# Patient Record
Sex: FEMALE | Race: WHITE | NOT HISPANIC OR LATINO | Employment: FULL TIME | URBAN - METROPOLITAN AREA
[De-identification: names, ages, dates, MRNs, and addresses within clinical notes are randomized per-mention and may not be internally consistent; named-entity substitution may affect disease eponyms.]

---

## 2022-04-25 ENCOUNTER — HOSPITAL ENCOUNTER (EMERGENCY)
Facility: HOSPITAL | Age: 27
Discharge: PSYCHIATRIC OR SUBSTANCE USE FACILITY | End: 2022-04-26
Attending: EMERGENCY MEDICINE | Admitting: EMERGENCY MEDICINE

## 2022-04-25 DIAGNOSIS — R45.851 DEPRESSION WITH SUICIDAL IDEATION: Primary | ICD-10-CM

## 2022-04-25 DIAGNOSIS — F32.A DEPRESSION WITH SUICIDAL IDEATION: Primary | ICD-10-CM

## 2022-04-25 DIAGNOSIS — F31.9 BIPOLAR 1 DISORDER: ICD-10-CM

## 2022-04-25 LAB
ALBUMIN SERPL-MCNC: 4.3 G/DL (ref 3.5–5.2)
ALBUMIN/GLOB SERPL: 1.5 G/DL
ALP SERPL-CCNC: 127 U/L (ref 39–117)
ALT SERPL W P-5'-P-CCNC: 28 U/L (ref 1–33)
AMPHET+METHAMPHET UR QL: NEGATIVE
AMPHETAMINES UR QL: NEGATIVE
ANION GAP SERPL CALCULATED.3IONS-SCNC: 10 MMOL/L (ref 5–15)
APAP SERPL-MCNC: <5 MCG/ML (ref 0–30)
AST SERPL-CCNC: 21 U/L (ref 1–32)
B-HCG UR QL: NEGATIVE
BACTERIA UR QL AUTO: ABNORMAL /HPF
BARBITURATES UR QL SCN: NEGATIVE
BASOPHILS # BLD AUTO: 0.12 10*3/MM3 (ref 0–0.2)
BASOPHILS NFR BLD AUTO: 1 % (ref 0–1.5)
BENZODIAZ UR QL SCN: NEGATIVE
BILIRUB SERPL-MCNC: 0.2 MG/DL (ref 0–1.2)
BILIRUB UR QL STRIP: NEGATIVE
BUN SERPL-MCNC: 10 MG/DL (ref 6–20)
BUN/CREAT SERPL: 14.3 (ref 7–25)
BUPRENORPHINE SERPL-MCNC: NEGATIVE NG/ML
CALCIUM SPEC-SCNC: 8.9 MG/DL (ref 8.6–10.5)
CANNABINOIDS SERPL QL: NEGATIVE
CHLORIDE SERPL-SCNC: 108 MMOL/L (ref 98–107)
CLARITY UR: ABNORMAL
CO2 SERPL-SCNC: 23 MMOL/L (ref 22–29)
COCAINE UR QL: NEGATIVE
COLOR UR: YELLOW
CREAT SERPL-MCNC: 0.7 MG/DL (ref 0.57–1)
DEPRECATED RDW RBC AUTO: 37.2 FL (ref 37–54)
EGFRCR SERPLBLD CKD-EPI 2021: 123.3 ML/MIN/1.73
EOSINOPHIL # BLD AUTO: 0.39 10*3/MM3 (ref 0–0.4)
EOSINOPHIL NFR BLD AUTO: 3.3 % (ref 0.3–6.2)
ERYTHROCYTE [DISTWIDTH] IN BLOOD BY AUTOMATED COUNT: 12.4 % (ref 12.3–15.4)
ETHANOL BLD-MCNC: <10 MG/DL (ref 0–10)
GLOBULIN UR ELPH-MCNC: 2.8 GM/DL
GLUCOSE SERPL-MCNC: 79 MG/DL (ref 65–99)
GLUCOSE UR STRIP-MCNC: NEGATIVE MG/DL
HCT VFR BLD AUTO: 40 % (ref 34–46.6)
HGB BLD-MCNC: 13.8 G/DL (ref 12–15.9)
HGB UR QL STRIP.AUTO: NEGATIVE
HOLD SPECIMEN: NORMAL
HOLD SPECIMEN: NORMAL
HYALINE CASTS UR QL AUTO: ABNORMAL /LPF
IMM GRANULOCYTES # BLD AUTO: 0.29 10*3/MM3 (ref 0–0.05)
IMM GRANULOCYTES NFR BLD AUTO: 2.5 % (ref 0–0.5)
KETONES UR QL STRIP: NEGATIVE
LEUKOCYTE ESTERASE UR QL STRIP.AUTO: ABNORMAL
LYMPHOCYTES # BLD AUTO: 3.07 10*3/MM3 (ref 0.7–3.1)
LYMPHOCYTES NFR BLD AUTO: 26 % (ref 19.6–45.3)
MCH RBC QN AUTO: 29.1 PG (ref 26.6–33)
MCHC RBC AUTO-ENTMCNC: 34.5 G/DL (ref 31.5–35.7)
MCV RBC AUTO: 84.2 FL (ref 79–97)
METHADONE UR QL SCN: NEGATIVE
MONOCYTES # BLD AUTO: 0.91 10*3/MM3 (ref 0.1–0.9)
MONOCYTES NFR BLD AUTO: 7.7 % (ref 5–12)
NEUTROPHILS NFR BLD AUTO: 59.5 % (ref 42.7–76)
NEUTROPHILS NFR BLD AUTO: 7.03 10*3/MM3 (ref 1.7–7)
NITRITE UR QL STRIP: NEGATIVE
NRBC BLD AUTO-RTO: 0 /100 WBC (ref 0–0.2)
OPIATES UR QL: NEGATIVE
OXYCODONE UR QL SCN: NEGATIVE
PCP UR QL SCN: NEGATIVE
PH UR STRIP.AUTO: 6.5 [PH] (ref 5–8)
PLATELET # BLD AUTO: 295 10*3/MM3 (ref 140–450)
PMV BLD AUTO: 8.9 FL (ref 6–12)
POTASSIUM SERPL-SCNC: 4 MMOL/L (ref 3.5–5.2)
PROPOXYPH UR QL: NEGATIVE
PROT SERPL-MCNC: 7.1 G/DL (ref 6–8.5)
PROT UR QL STRIP: NEGATIVE
RBC # BLD AUTO: 4.75 10*6/MM3 (ref 3.77–5.28)
RBC # UR STRIP: ABNORMAL /HPF
REF LAB TEST METHOD: ABNORMAL
SALICYLATES SERPL-MCNC: <0.3 MG/DL
SARS-COV-2 RDRP RESP QL NAA+PROBE: NORMAL
SODIUM SERPL-SCNC: 141 MMOL/L (ref 136–145)
SP GR UR STRIP: 1.01 (ref 1–1.03)
SQUAMOUS #/AREA URNS HPF: ABNORMAL /HPF
TRICYCLICS UR QL SCN: NEGATIVE
TSH SERPL DL<=0.05 MIU/L-ACNC: 1.9 UIU/ML (ref 0.27–4.2)
UROBILINOGEN UR QL STRIP: ABNORMAL
WBC # UR STRIP: ABNORMAL /HPF
WBC NRBC COR # BLD: 11.81 10*3/MM3 (ref 3.4–10.8)
WHOLE BLOOD HOLD SPECIMEN: NORMAL
WHOLE BLOOD HOLD SPECIMEN: NORMAL
YEAST URNS QL MICRO: ABNORMAL /HPF

## 2022-04-25 PROCEDURE — C9803 HOPD COVID-19 SPEC COLLECT: HCPCS

## 2022-04-25 PROCEDURE — 82077 ASSAY SPEC XCP UR&BREATH IA: CPT | Performed by: EMERGENCY MEDICINE

## 2022-04-25 PROCEDURE — 80053 COMPREHEN METABOLIC PANEL: CPT | Performed by: EMERGENCY MEDICINE

## 2022-04-25 PROCEDURE — 99284 EMERGENCY DEPT VISIT MOD MDM: CPT

## 2022-04-25 PROCEDURE — 80179 DRUG ASSAY SALICYLATE: CPT | Performed by: EMERGENCY MEDICINE

## 2022-04-25 PROCEDURE — 84443 ASSAY THYROID STIM HORMONE: CPT | Performed by: EMERGENCY MEDICINE

## 2022-04-25 PROCEDURE — 85025 COMPLETE CBC W/AUTO DIFF WBC: CPT | Performed by: EMERGENCY MEDICINE

## 2022-04-25 PROCEDURE — 80306 DRUG TEST PRSMV INSTRMNT: CPT | Performed by: EMERGENCY MEDICINE

## 2022-04-25 PROCEDURE — 81001 URINALYSIS AUTO W/SCOPE: CPT | Performed by: EMERGENCY MEDICINE

## 2022-04-25 PROCEDURE — 81025 URINE PREGNANCY TEST: CPT | Performed by: EMERGENCY MEDICINE

## 2022-04-25 PROCEDURE — 87635 SARS-COV-2 COVID-19 AMP PRB: CPT | Performed by: EMERGENCY MEDICINE

## 2022-04-25 PROCEDURE — 36415 COLL VENOUS BLD VENIPUNCTURE: CPT

## 2022-04-25 PROCEDURE — 80143 DRUG ASSAY ACETAMINOPHEN: CPT | Performed by: EMERGENCY MEDICINE

## 2022-04-25 RX ORDER — OLANZAPINE 5 MG/1
5 TABLET ORAL NIGHTLY
COMMUNITY

## 2022-04-25 RX ORDER — DOXEPIN HYDROCHLORIDE 10 MG/1
5 CAPSULE ORAL NIGHTLY
Status: ON HOLD | COMMUNITY
End: 2022-04-26

## 2022-04-25 NOTE — ED PROVIDER NOTES
Subjective   Patient presents to the emergency department with a 1-1/2-week history of suicidal thoughts and ideation.  Patient denies any attempt at self-harm.  However, she does have a plan formulated which includes overdosing on her medications.  She denies any other significant past medical history.  No chest pain or shortness of breath.  No abdominal pain, nausea vomiting or diarrhea.  Patient states she does not believe she is pregnant but states that it is a chance.  She denies any illicit drug use.  She reports a past history of tobacco and alcohol use, though she reports she has not used either recently.      History provided by:  Patient      Review of Systems   Constitutional: Negative.    Respiratory: Negative.    Cardiovascular: Negative.    Gastrointestinal: Negative.    Musculoskeletal: Negative.    Neurological: Negative.    Psychiatric/Behavioral: Positive for suicidal ideas.   All other systems reviewed and are negative.      Past Medical History:   Diagnosis Date   • Bipolar 1 disorder (HCC)    • Depression    • Major depression    • Schizo affective schizophrenia (HCC)        No Known Allergies    History reviewed. No pertinent surgical history.    History reviewed. No pertinent family history.    Social History     Socioeconomic History   • Marital status:    Tobacco Use   • Smoking status: Former Smoker   • Smokeless tobacco: Never Used   Vaping Use   • Vaping Use: Every day   • Substances: Nicotine   Substance and Sexual Activity   • Alcohol use: Never   • Drug use: Never   • Sexual activity: Yes     Partners: Male     Birth control/protection: None           Objective   Physical Exam  Vitals and nursing note reviewed.   Constitutional:       General: She is not in acute distress.     Appearance: Normal appearance. She is obese. She is not toxic-appearing.   Cardiovascular:      Rate and Rhythm: Normal rate and regular rhythm.      Pulses: Normal pulses.   Pulmonary:      Effort:  Pulmonary effort is normal. No respiratory distress.      Breath sounds: Normal breath sounds.   Abdominal:      Palpations: Abdomen is soft.      Tenderness: There is no abdominal tenderness. There is no guarding.   Musculoskeletal:         General: Normal range of motion.   Skin:     General: Skin is warm and dry.   Neurological:      Mental Status: She is alert and oriented to person, place, and time.   Psychiatric:         Mood and Affect: Mood is depressed. Affect is flat.         Speech: Speech normal.         Behavior: Behavior is cooperative.         Procedures           ED Course  ED Course as of 04/26/22 1456   Mon Apr 25, 2022 2127 HCG, Urine QL: Negative [RS]   Tue Apr 26, 2022   0015 Patient is resting comfortably.  Awaiting evaluation and disposition by behavioral health. [RS]   0021 Behavioral health refused further evaluation until we obtained an EKG.  There is no acute indication for EKG.  However, due to request, we did obtain one and as expected it is a normal sinus rhythm without ectopy, ischemic changes, or concerning findings.  We will continue to await behavioral health disposition. [RS]   1454 Patient accepted for admission to behavioral health.  See their documentation for further details. [RS]      ED Course User Index  [RS] Ankur Graves MD                                                 MDM  Number of Diagnoses or Management Options  Bipolar 1 disorder (HCC)  Depression with suicidal ideation  Diagnosis management comments: Recent Results (from the past 24 hour(s))  -Comprehensive Metabolic Panel:   Collection Time: 04/25/22  8:12 PM  Specimen: Blood       Result                      Value             Ref Range           Glucose                     79                65 - 99 mg/dL       BUN                         10                6 - 20 mg/dL        Creatinine                  0.70              0.57 - 1.00 *       Sodium                      141               136 - 145 mm*        Potassium                   4.0               3.5 - 5.2 mm*       Chloride                    108 (H)           98 - 107 mmo*       CO2                         23.0              22.0 - 29.0 *       Calcium                     8.9               8.6 - 10.5 m*       Total Protein               7.1               6.0 - 8.5 g/*       Albumin                     4.30              3.50 - 5.20 *       ALT (SGPT)                  28                1 - 33 U/L          AST (SGOT)                  21                1 - 32 U/L          Alkaline Phosphatase        127 (H)           39 - 117 U/L        Total Bilirubin             0.2               0.0 - 1.2 mg*       Globulin                    2.8               gm/dL               A/G Ratio                   1.5               g/dL                BUN/Creatinine Ratio        14.3              7.0 - 25.0          Anion Gap                   10.0              5.0 - 15.0 m*       eGFR                        123.3             >60.0 mL/min*  -Acetaminophen Level:   Collection Time: 04/25/22  8:12 PM  Specimen: Blood       Result                      Value             Ref Range           Acetaminophen               <5.0              0.0 - 30.0 m*  -Ethanol:   Collection Time: 04/25/22  8:12 PM  Specimen: Blood       Result                      Value             Ref Range           Ethanol                     <10               0 - 10 mg/dL   -Salicylate Level:   Collection Time: 04/25/22  8:12 PM  Specimen: Blood       Result                      Value             Ref Range           Salicylate                  <0.3              <=30.0 mg/dL   -Urine Drug Screen - Urine, Clean Catch:   Collection Time: 04/25/22  8:12 PM  Specimen: Urine, Clean Catch       Result                      Value             Ref Range           THC, Screen, Urine          Negative          Negative            Phencyclidine (PCP), U*     Negative          Negative            Cocaine Screen, Urine       Negative           Negative            Methamphetamine, Ur         Negative          Negative            Opiate Screen               Negative          Negative            Amphetamine Screen, Ur*     Negative          Negative            Benzodiazepine Screen,*     Negative          Negative            Tricyclic Antidepressa*     Negative          Negative            Methadone Screen, Urine     Negative          Negative            Barbiturates Screen, U*     Negative          Negative            Oxycodone Screen, Urine     Negative          Negative            Propoxyphene Screen         Negative          Negative            Buprenorphine, Screen,*     Negative          Negative       -TSH:   Collection Time: 04/25/22  8:12 PM  Specimen: Blood       Result                      Value             Ref Range           TSH                         1.900             0.270 - 4.20*  -Green Top (Gel):   Collection Time: 04/25/22  8:12 PM       Result                      Value             Ref Range           Extra Tube                                                    Hold for add-ons.  -Lavender Top:   Collection Time: 04/25/22  8:12 PM       Result                      Value             Ref Range           Extra Tube                                                    hold for add-on  -Gold Top - SST:   Collection Time: 04/25/22  8:12 PM       Result                      Value             Ref Range           Extra Tube                                                    Hold for add-ons.  -Gray Top:   Collection Time: 04/25/22  8:12 PM       Result                      Value             Ref Range           Extra Tube                                                    Hold for add-ons.  -Light Blue Top:   Collection Time: 04/25/22  8:12 PM       Result                      Value             Ref Range           Extra Tube                                                    hold for add-on  -CBC Auto Differential:   Collection Time: 04/25/22   8:12 PM  Specimen: Blood       Result                      Value             Ref Range           WBC                         11.81 (H)         3.40 - 10.80*       RBC                         4.75              3.77 - 5.28 *       Hemoglobin                  13.8              12.0 - 15.9 *       Hematocrit                  40.0              34.0 - 46.6 %       MCV                         84.2              79.0 - 97.0 *       MCH                         29.1              26.6 - 33.0 *       MCHC                        34.5              31.5 - 35.7 *       RDW                         12.4              12.3 - 15.4 %       RDW-SD                      37.2              37.0 - 54.0 *       MPV                         8.9               6.0 - 12.0 fL       Platelets                   295               140 - 450 10*       Neutrophil %                59.5              42.7 - 76.0 %       Lymphocyte %                26.0              19.6 - 45.3 %       Monocyte %                  7.7               5.0 - 12.0 %        Eosinophil %                3.3               0.3 - 6.2 %         Basophil %                  1.0               0.0 - 1.5 %         Immature Grans %            2.5 (H)           0.0 - 0.5 %         Neutrophils, Absolute       7.03 (H)          1.70 - 7.00 *       Lymphocytes, Absolute       3.07              0.70 - 3.10 *       Monocytes, Absolute         0.91 (H)          0.10 - 0.90 *       Eosinophils, Absolute       0.39              0.00 - 0.40 *       Basophils, Absolute         0.12              0.00 - 0.20 *       Immature Grans, Absolu*     0.29 (H)          0.00 - 0.05 *       nRBC                        0.0               0.0 - 0.2 /1*  -Pregnancy, Urine - Urine, Clean Catch:   Collection Time: 04/25/22  8:12 PM  Specimen: Urine, Clean Catch       Result                      Value             Ref Range           HCG, Urine QL               Negative          Negative       -Urinalysis With Microscopic If  Indicated (No Culture) - Urine, Clean Catch:   Collection Time: 04/25/22  8:12 PM  Specimen: Urine, Clean Catch       Result                      Value             Ref Range           Color, UA                   Yellow            Yellow, Straw       Appearance, UA              Cloudy (A)        Clear               pH, UA                      6.5               5.0 - 8.0           Specific [place], UA        1.006             1.001 - 1.030       Glucose, UA                 Negative          Negative            Ketones, UA                 Negative          Negative            Bilirubin, UA               Negative          Negative            Blood, UA                   Negative          Negative            Protein, UA                 Negative          Negative            Leuk Esterase, UA                             Negative        Large (3+) (A)       Nitrite, UA                 Negative          Negative            Urobilinogen, UA            0.2 E.U./dL       0.2 - 1.0 E.*  -Urinalysis, Microscopic Only - Urine, Clean Catch:   Collection Time: 04/25/22  8:12 PM  Specimen: Urine, Clean Catch       Result                      Value             Ref Range           RBC, UA                     0-2               None Seen, 0*       WBC, UA                     13-20 (A)         None Seen, 0*       Bacteria, UA                1+ (A)            None Seen, T*       Squamous Epithelial Ce*     3-6 (A)           None Seen, 0*       Yeast, UA                                     None Seen /H*   Small/1+ Yeast       Hyaline Casts, UA           None Seen         0 - 6 /LPF          Methodology                                                   Manual Light Microscopy  -COVID-19, [facility] IN-HOUSE,NASAL Swab (NO TRANSPORT MEDIA) 2 HR TAT - Swab, Nasopharynx:   Collection Time: 04/25/22 10:31 PM  Specimen: Nasopharynx; Swab       Result                      Value             Ref Range           COVID19                                      "  Presumptive *   Presumptive Negative  -ECG 12 Lead:   Collection Time: 04/26/22 12:20 AM       Result                      Value             Ref Range           QT Interval                 392               ms                  QTC Interval                416               ms             Note: In addition to lab results from this visit, the labs listed above may include labs taken at another facility or during a different encounter within the last 24 hours. Please correlate lab times with ED admission and discharge times for further clarification of the services performed during this visit.    No orders to display  --------------------------------------------------------               04/25/22 04/25/22 04/26/22                      1941          2348         0243         --------------------------------------------------------   BP:          126/80        117/99       133/83         BP Location:    Left arm                                   Patient Position:     Sitting                                   Pulse:         97            71           71           Resp:          18            18           18           Temp:   98.5 °F (36.9 °C)          98.5 °F (36.9 °C)   TempSrc:      Oral                                     SpO2:          98%          98%           98%          Weight: 81.6 kg (180 lb)                               Height:  162.6 cm (64\")                               --------------------------------------------------------  Medications - No data to display  ECG/EMG Results (last 24 hours)     Procedure Component Value Units Date/Time    ECG 12 Lead ([id number]) Collected: 04/26/22 0020     Updated: 04/26/22 0022     QT Interval 392 ms      QTC Interval 416 ms     Narrative:      Test Reason : MEDICAL CLEARANCE  Blood Pressure :   */*   mmHG  Vent. Rate :  68 BPM     Atrial Rate :  68 BPM     P-R Int : 118 ms          QRS Dur :  " 86 ms      QT Int : 392 ms       P-R-T Axes :  25  16   5 degrees     QTc Int : 416 ms    Normal sinus rhythm with sinus arrhythmia  Normal ECG  No previous ECGs available  Confirmed by ANKUR GRAVES MD ([id number]) on 4/26/2022 12:22:28 AM    Referred By: MD           Confirmed By: ANKUR GRAVES MD      ECG 12 Lead   Final Result    Test Reason : MEDICAL CLEARANCE    Blood Pressure :   */*   mmHG    Vent. Rate :  68 BPM     Atrial Rate :  68 BPM       P-R Int : 118 ms          QRS Dur :  86 ms        QT Int : 392 ms       P-R-T Axes :  25  16   5 degrees       QTc Int : 416 ms        Normal sinus rhythm with sinus arrhythmia    Normal ECG    No previous ECGs available    Confirmed by ANKUR GRAVES MD ([id number]) on 4/26/2022 12:22:28 AM        Referred By: MD           Confirmed By: ANKUR GRAVES MD            Amount and/or Complexity of Data Reviewed  Clinical lab tests: reviewed  Tests in the medicine section of CPT®: reviewed        Final diagnoses:   Depression with suicidal ideation   Bipolar 1 disorder (HCC)       ED Disposition  ED Disposition     ED Disposition   DC/Transfer to Behavioral Health Condition   Stable    Comment   --             No follow-up provider specified.       Medication List      No changes were made to your prescriptions during this visit.          Ankur Graves MD  04/26/22 0997

## 2022-04-26 ENCOUNTER — HOSPITAL ENCOUNTER (INPATIENT)
Facility: HOSPITAL | Age: 27
LOS: 1 days | Discharge: HOME | End: 2022-04-26
Attending: PSYCHIATRY & NEUROLOGY | Admitting: PSYCHIATRY & NEUROLOGY

## 2022-04-26 VITALS
SYSTOLIC BLOOD PRESSURE: 152 MMHG | OXYGEN SATURATION: 98 % | DIASTOLIC BLOOD PRESSURE: 98 MMHG | HEART RATE: 97 BPM | BODY MASS INDEX: 34.56 KG/M2 | WEIGHT: 202.4 LBS | TEMPERATURE: 96.7 F | HEIGHT: 64 IN | RESPIRATION RATE: 18 BRPM

## 2022-04-26 VITALS
OXYGEN SATURATION: 98 % | HEART RATE: 71 BPM | SYSTOLIC BLOOD PRESSURE: 133 MMHG | WEIGHT: 180 LBS | TEMPERATURE: 98.5 F | RESPIRATION RATE: 18 BRPM | DIASTOLIC BLOOD PRESSURE: 83 MMHG | HEIGHT: 64 IN | BODY MASS INDEX: 30.73 KG/M2

## 2022-04-26 DIAGNOSIS — R45.851 SUICIDAL IDEATION: ICD-10-CM

## 2022-04-26 LAB
HOLD SPECIMEN: NORMAL
QT INTERVAL: 392 MS
QTC INTERVAL: 416 MS

## 2022-04-26 PROCEDURE — 99236 HOSP IP/OBS SAME DATE HI 85: CPT | Performed by: PSYCHIATRY & NEUROLOGY

## 2022-04-26 PROCEDURE — 93005 ELECTROCARDIOGRAM TRACING: CPT | Performed by: EMERGENCY MEDICINE

## 2022-04-26 RX ORDER — ECHINACEA PURPUREA EXTRACT 125 MG
2 TABLET ORAL AS NEEDED
Status: DISCONTINUED | OUTPATIENT
Start: 2022-04-26 | End: 2022-04-26 | Stop reason: HOSPADM

## 2022-04-26 RX ORDER — ONDANSETRON 4 MG/1
4 TABLET, FILM COATED ORAL EVERY 6 HOURS PRN
Status: DISCONTINUED | OUTPATIENT
Start: 2022-04-26 | End: 2022-04-26 | Stop reason: HOSPADM

## 2022-04-26 RX ORDER — LOPERAMIDE HYDROCHLORIDE 2 MG/1
2 CAPSULE ORAL
Status: DISCONTINUED | OUTPATIENT
Start: 2022-04-26 | End: 2022-04-26 | Stop reason: HOSPADM

## 2022-04-26 RX ORDER — BENZONATATE 100 MG/1
100 CAPSULE ORAL 3 TIMES DAILY PRN
Status: DISCONTINUED | OUTPATIENT
Start: 2022-04-26 | End: 2022-04-26 | Stop reason: HOSPADM

## 2022-04-26 RX ORDER — TRAZODONE HYDROCHLORIDE 50 MG/1
50 TABLET ORAL NIGHTLY PRN
Status: DISCONTINUED | OUTPATIENT
Start: 2022-04-26 | End: 2022-04-26 | Stop reason: HOSPADM

## 2022-04-26 RX ORDER — DOXEPIN HYDROCHLORIDE 10 MG/1
10 CAPSULE ORAL NIGHTLY
Status: DISCONTINUED | OUTPATIENT
Start: 2022-04-26 | End: 2022-04-26 | Stop reason: HOSPADM

## 2022-04-26 RX ORDER — ACETAMINOPHEN 325 MG/1
650 TABLET ORAL EVERY 6 HOURS PRN
Status: DISCONTINUED | OUTPATIENT
Start: 2022-04-26 | End: 2022-04-26 | Stop reason: HOSPADM

## 2022-04-26 RX ORDER — DOXEPIN HYDROCHLORIDE 10 MG/1
10 CAPSULE ORAL NIGHTLY
COMMUNITY

## 2022-04-26 RX ORDER — FAMOTIDINE 20 MG/1
20 TABLET, FILM COATED ORAL 2 TIMES DAILY PRN
Status: DISCONTINUED | OUTPATIENT
Start: 2022-04-26 | End: 2022-04-26 | Stop reason: HOSPADM

## 2022-04-26 RX ORDER — BENZTROPINE MESYLATE 1 MG/ML
1 INJECTION INTRAMUSCULAR; INTRAVENOUS ONCE AS NEEDED
Status: DISCONTINUED | OUTPATIENT
Start: 2022-04-26 | End: 2022-04-26 | Stop reason: HOSPADM

## 2022-04-26 RX ORDER — ALUMINA, MAGNESIA, AND SIMETHICONE 2400; 2400; 240 MG/30ML; MG/30ML; MG/30ML
15 SUSPENSION ORAL EVERY 6 HOURS PRN
Status: DISCONTINUED | OUTPATIENT
Start: 2022-04-26 | End: 2022-04-26 | Stop reason: HOSPADM

## 2022-04-26 RX ORDER — OLANZAPINE 5 MG/1
5 TABLET ORAL NIGHTLY
Status: DISCONTINUED | OUTPATIENT
Start: 2022-04-26 | End: 2022-04-26

## 2022-04-26 RX ORDER — IBUPROFEN 400 MG/1
400 TABLET ORAL EVERY 6 HOURS PRN
Status: DISCONTINUED | OUTPATIENT
Start: 2022-04-26 | End: 2022-04-26 | Stop reason: HOSPADM

## 2022-04-26 RX ORDER — HYDROXYZINE 50 MG/1
50 TABLET, FILM COATED ORAL EVERY 6 HOURS PRN
Status: DISCONTINUED | OUTPATIENT
Start: 2022-04-26 | End: 2022-04-26 | Stop reason: HOSPADM

## 2022-04-26 RX ORDER — BUPROPION HYDROCHLORIDE 150 MG/1
150 TABLET ORAL DAILY
Status: DISCONTINUED | OUTPATIENT
Start: 2022-04-26 | End: 2022-04-26 | Stop reason: HOSPADM

## 2022-04-26 RX ORDER — OLANZAPINE 10 MG/1
10 TABLET ORAL NIGHTLY
Status: DISCONTINUED | OUTPATIENT
Start: 2022-04-26 | End: 2022-04-26 | Stop reason: HOSPADM

## 2022-04-26 RX ORDER — BENZTROPINE MESYLATE 1 MG/1
2 TABLET ORAL ONCE AS NEEDED
Status: DISCONTINUED | OUTPATIENT
Start: 2022-04-26 | End: 2022-04-26 | Stop reason: HOSPADM

## 2022-04-26 RX ORDER — NICOTINE 21 MG/24HR
1 PATCH, TRANSDERMAL 24 HOURS TRANSDERMAL
Status: DISCONTINUED | OUTPATIENT
Start: 2022-04-26 | End: 2022-04-26 | Stop reason: HOSPADM

## 2022-04-26 RX ADMIN — NICOTINE TRANSDERMAL SYSTEM 1 PATCH: 21 PATCH, EXTENDED RELEASE TRANSDERMAL at 09:10

## 2022-04-26 RX ADMIN — BUPROPION HYDROCHLORIDE 150 MG: 150 TABLET, FILM COATED, EXTENDED RELEASE ORAL at 14:41
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
## 2023-05-09 ENCOUNTER — PRIOR AUTHORIZATION (OUTPATIENT)
Dept: PSYCHIATRY | Facility: CLINIC | Age: 28
End: 2023-05-09
Payer: COMMERCIAL